# Patient Record
Sex: MALE | Race: WHITE
[De-identification: names, ages, dates, MRNs, and addresses within clinical notes are randomized per-mention and may not be internally consistent; named-entity substitution may affect disease eponyms.]

---

## 2019-01-10 ENCOUNTER — HOSPITAL ENCOUNTER (EMERGENCY)
Dept: HOSPITAL 41 - JD.ED | Age: 54
Discharge: HOME | End: 2019-01-10
Payer: COMMERCIAL

## 2019-01-10 VITALS — DIASTOLIC BLOOD PRESSURE: 89 MMHG | SYSTOLIC BLOOD PRESSURE: 143 MMHG

## 2019-01-10 DIAGNOSIS — I10: ICD-10-CM

## 2019-01-10 DIAGNOSIS — M25.521: ICD-10-CM

## 2019-01-10 DIAGNOSIS — M54.12: Primary | ICD-10-CM

## 2019-01-10 DIAGNOSIS — Z79.899: ICD-10-CM

## 2019-01-10 NOTE — EDM.PDOC
ED HPI GENERAL MEDICAL PROBLEM





- General


Chief Complaint: Upper Extremity Injury/Pain


Stated Complaint: RIGHT ARM ELOWB PAIN


Time Seen by Provider: 01/10/19 16:07


Source of Information: Reports: Patient, RN Notes Reviewed





- History of Present Illness


INITIAL COMMENTS - FREE TEXT/NARRATIVE: 





53 year old male comes in with sx of R arm, elbow pain that started during the 

night, continues quite severe today.  No known injury.  No chest pain or 

dififculty breathing. Hx of Htn but no hx of diabetes, CAD.  Does not smoke. 


  ** Right Shoulder


Pain Score (Numeric/FACES): 8





- Related Data


 Allergies











Allergy/AdvReac Type Severity Reaction Status Date / Time


 


No Known Allergies Allergy   Verified 01/10/19 16:12











Home Meds: 


 Home Meds





Blood Pressure.  01/10/19 [History]


Hydrocodone/Acetaminophen [Norco 5-325 Tablet] 1 each PO Q4HR PRN #14 tablet 01/

10/19 [Rx]


Naproxen [Naprosyn] 500 mg PO Q12HR #14 tab 01/10/19 [Rx]











Past Medical History





- Past Health History


Medical/Surgical History: Denies Medical/Surgical History


Cardiovascular History: Reports: Hypertension


Psychiatric History: Reports: None





- Past Surgical History


HEENT Surgical History: Reports: Tonsillectomy


Musculoskeletal Surgical History: Reports: Joint Replacement





Social & Family History





- Family History


Family Medical History: Noncontributory





- Tobacco Use


Smoking Status *Q: Unknown Ever Smoked





- Alcohol Use


Days Per Week of Alcohol Use: 6


Number of Drinks Per Day: 6


Total Drinks Per Week: 36





- Recreational Drug Use


Recreational Drug Use: No





Review of Systems





- Review of Systems


Review Of Systems: See Below


Constitutional: Denies: Chills, Fever


Eyes: Reports: No Symptoms


Ears: Reports: No Symptoms


Mouth/Throat: Reports: No Symptoms


Respiratory: Denies: Shortness of Breath, Pleuritic Chest Pain


Cardiovascular: Denies: Chest Pain


GI/Abdominal: Denies: Abdominal Pain, Nausea, Vomiting


Musculoskeletal: Reports: Shoulder Pain (R), Arm Pain (R upper arm to elbow), 

Joint Pain (R elbow).  Denies: Neck Pain, Back Pain, Leg Pain


Skin: Reports: No Symptoms


Neurological: Denies: Numbness, Tingling, Weakness





ED EXAM, GENERAL





- Physical Exam


Exam: See Below


General Appearance: Alert, Moderate Distress


Eye Exam: Bilateral Eye: PERRL


Head: Atraumatic


Neck: Supple, Other (nontender)


Respiratory/Chest: No Respiratory Distress, Lungs Clear


Cardiovascular: Regular Rate, Rhythm


Back Exam: Normal Inspection.  No: Paraspinal Tenderness, Vertebral Tenderness


Extremities: Limited Range of Motion (R elbow, pain with motion R elbow, tender 

ant. and post R elbow,  mild tenderness R ant arm but no swelling or bruising, 

no warmth or erthema)


Skin Exam: Warm, Dry, Normal Color





Course





- Vital Signs


Last Recorded V/S: 


 Last Vital Signs











Temp  98.5 F   01/10/19 16:10


 


Pulse  101 H  01/10/19 16:10


 


Resp  20   01/10/19 16:10


 


BP  143/89 H  01/10/19 16:10


 


Pulse Ox  98   01/10/19 16:10














- Orders/Labs/Meds


Orders: 


 Active Orders 24 hr











 Category Date Time Status


 


 EKG Documentation Completion [RC] ASDIRECTED Care  01/10/19 16:15 Active


 


 EKG 12 Lead [EK] Stat Ther  01/10/19 16:14 Ordered











Labs: 


 Laboratory Tests











  01/10/19 Range/Units





  16:45 


 


Troponin I  < 0.017  (0.00-0.056)  ng/mL














- Re-Assessments/Exams


Free Text/Narrative Re-Assessment/Exam: 





01/10/19 19:37


EKG nl, trop nl,  Xray of elbow shows degenerative changes and evidence of old 

injury. Discharge instr.  as documented.  





Departure





- Departure


Time of Disposition: 17:29


Disposition: Home, Self-Care 01


Condition: Fair


Clinical Impression: 


 Cervical radiculopathy








- Discharge Information


Prescriptions: 


Hydrocodone/Acetaminophen [Norco 5-325 Tablet] 1 each PO Q4HR PRN #14 tablet


 PRN Reason: Pain


Naproxen [Naprosyn] 500 mg PO Q12HR #14 tab


Instructions:  Cervical Radiculopathy, Easy-to-Read


Referrals: 


PCP,None [Primary Care Provider] - 


Forms:  ED Department Discharge


Additional Instructions: 


rest arm, no heavy lifting, naprosyn 500 mg twice daily. You may take tylenol 

in addition up to 3 times daily for extra pain relief or hydrocodone if needed 

for severe pain.  Do not take tylenol and hydrocodone at the same time,  do not 

drive or work when taking hydrocodone.   Alternate ice and heat as needed.  

Follow up clinic if not much better within 3 to 4 days as expected.  Return to 

ED as needed if symptoms worsening in any way.  





- My Orders


Last 24 Hours: 


My Active Orders





01/10/19 16:14


EKG 12 Lead [EK] Stat 





01/10/19 16:15


EKG Documentation Completion [RC] ASDIRECTED 














- Assessment/Plan


Last 24 Hours: 


My Active Orders





01/10/19 16:14


EKG 12 Lead [EK] Stat 





01/10/19 16:15


EKG Documentation Completion [RC] ASDIRECTED
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
## 2020-11-30 NOTE — EDM.PDOC
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














- My Orders


Last 24 Hours: 


My Active Orders





11/30/20 14:54


EKG Documentation Completion [RC] STAT 


Oxygen Therapy [RC] ASDIRECTED 





11/30/20 14:55


URINALYSIS W/MICROSCOPIC [UA W/MICROSCOPIC] [URIN] Stat 





11/30/20 15:00


Sodium Chloride 0.9% [Normal Saline] 1,000 ml IV ASDIRECTED 





11/30/20 15:05


TYPE AND SCREEN [BBK] Stat 





11/30/20 16:42


PATIENT RETYPE [BBK] Routine 





11/30/20 17:25


Durable Medical Equipment for Discharge [DME for Discharge] [COMM] Stat 














- Assessment/Plan


Last 24 Hours: 


My Active Orders





11/30/20 14:54


EKG Documentation Completion [RC] STAT 


Oxygen Therapy [RC] ASDIRECTED 





11/30/20 14:55


URINALYSIS W/MICROSCOPIC [UA W/MICROSCOPIC] [URIN] Stat 





11/30/20 15:00


Sodium Chloride 0.9% [Normal Saline] 1,000 ml IV ASDIRECTED 





11/30/20 15:05


TYPE AND SCREEN [BBK] Stat 





11/30/20 16:42


PATIENT RETYPE [BBK] Routine 





11/30/20 17:25


Durable Medical Equipment for Discharge [DME for Discharge] [COMM] Stat
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
